# Patient Record
Sex: MALE | Race: WHITE
[De-identification: names, ages, dates, MRNs, and addresses within clinical notes are randomized per-mention and may not be internally consistent; named-entity substitution may affect disease eponyms.]

---

## 2018-03-14 ENCOUNTER — HOSPITAL ENCOUNTER (EMERGENCY)
Dept: HOSPITAL 47 - EC | Age: 28
Discharge: HOME | End: 2018-03-14
Payer: COMMERCIAL

## 2018-03-14 VITALS — RESPIRATION RATE: 18 BRPM | TEMPERATURE: 97.7 F

## 2018-03-14 VITALS — DIASTOLIC BLOOD PRESSURE: 60 MMHG | SYSTOLIC BLOOD PRESSURE: 125 MMHG | HEART RATE: 77 BPM

## 2018-03-14 DIAGNOSIS — N20.1: Primary | ICD-10-CM

## 2018-03-14 LAB
ALBUMIN SERPL-MCNC: 4.3 G/DL (ref 3.5–5)
ALP SERPL-CCNC: 112 U/L (ref 38–126)
ALT SERPL-CCNC: 41 U/L (ref 21–72)
AMYLASE SERPL-CCNC: 31 U/L (ref 30–110)
ANION GAP SERPL CALC-SCNC: 13 MMOL/L
AST SERPL-CCNC: 27 U/L (ref 17–59)
BASOPHILS # BLD AUTO: 0.1 K/UL (ref 0–0.2)
BASOPHILS NFR BLD AUTO: 0 %
BUN SERPL-SCNC: 14 MG/DL (ref 9–20)
CALCIUM SPEC-MCNC: 9.9 MG/DL (ref 8.4–10.2)
CHLORIDE SERPL-SCNC: 107 MMOL/L (ref 98–107)
CO2 SERPL-SCNC: 22 MMOL/L (ref 22–30)
EOSINOPHIL # BLD AUTO: 0.2 K/UL (ref 0–0.7)
EOSINOPHIL NFR BLD AUTO: 2 %
ERYTHROCYTE [DISTWIDTH] IN BLOOD BY AUTOMATED COUNT: 5.23 M/UL (ref 4.3–5.9)
ERYTHROCYTE [DISTWIDTH] IN BLOOD: 13.2 % (ref 11.5–15.5)
GLUCOSE SERPL-MCNC: 112 MG/DL (ref 74–99)
HCT VFR BLD AUTO: 43.1 % (ref 39–53)
HGB BLD-MCNC: 14.4 GM/DL (ref 13–17.5)
LIPASE SERPL-CCNC: 53 U/L (ref 23–300)
LYMPHOCYTES # SPEC AUTO: 1.7 K/UL (ref 1–4.8)
LYMPHOCYTES NFR SPEC AUTO: 16 %
MCH RBC QN AUTO: 27.5 PG (ref 25–35)
MCHC RBC AUTO-ENTMCNC: 33.4 G/DL (ref 31–37)
MCV RBC AUTO: 82.4 FL (ref 80–100)
MONOCYTES # BLD AUTO: 0.4 K/UL (ref 0–1)
MONOCYTES NFR BLD AUTO: 4 %
NEUTROPHILS # BLD AUTO: 8.3 K/UL (ref 1.3–7.7)
NEUTROPHILS NFR BLD AUTO: 76 %
PH UR: 5.5 [PH] (ref 5–8)
PLATELET # BLD AUTO: 295 K/UL (ref 150–450)
POTASSIUM SERPL-SCNC: 4.4 MMOL/L (ref 3.5–5.1)
PROT SERPL-MCNC: 7.2 G/DL (ref 6.3–8.2)
PROT UR QL: (no result)
RBC UR QL: 20 /HPF (ref 0–5)
SODIUM SERPL-SCNC: 142 MMOL/L (ref 137–145)
SP GR UR: 1.03 (ref 1–1.03)
UROBILINOGEN UR QL STRIP: <2 MG/DL (ref ?–2)
WBC # BLD AUTO: 10.9 K/UL (ref 3.8–10.6)
WBC #/AREA URNS HPF: 2 /HPF (ref 0–5)

## 2018-03-14 PROCEDURE — 36415 COLL VENOUS BLD VENIPUNCTURE: CPT

## 2018-03-14 PROCEDURE — 81001 URINALYSIS AUTO W/SCOPE: CPT

## 2018-03-14 PROCEDURE — 74176 CT ABD & PELVIS W/O CONTRAST: CPT

## 2018-03-14 PROCEDURE — 83690 ASSAY OF LIPASE: CPT

## 2018-03-14 PROCEDURE — 85025 COMPLETE CBC W/AUTO DIFF WBC: CPT

## 2018-03-14 PROCEDURE — 82150 ASSAY OF AMYLASE: CPT

## 2018-03-14 PROCEDURE — 74018 RADEX ABDOMEN 1 VIEW: CPT

## 2018-03-14 PROCEDURE — 99284 EMERGENCY DEPT VISIT MOD MDM: CPT

## 2018-03-14 PROCEDURE — 96374 THER/PROPH/DIAG INJ IV PUSH: CPT

## 2018-03-14 PROCEDURE — 96375 TX/PRO/DX INJ NEW DRUG ADDON: CPT

## 2018-03-14 PROCEDURE — 80053 COMPREHEN METABOLIC PANEL: CPT

## 2018-03-14 NOTE — ED
General Adult HPI





- General


Chief complaint: Abdominal Pain


Stated complaint: Right Side Pain


Time Seen by Provider: 03/14/18 13:00


Source: patient, RN notes reviewed


Mode of arrival: ambulatory


Limitations: no limitations





- History of Present Illness


Initial comments: 





This is a 27-year-old male who presents emergency Department complaining of 

right-sided abdominal pain into his back patient denies any pain with 

palpation.  Patient states this made him nauseated and has been vomiting.  

Patient states it started at 9:00 today very abruptly.  Patient denies any 

history of kidney stones.  Patient denies any hematuria dysuria or urinary 

frequency.  Patient denies any diarrhea.  Patient denies any recent fever 

chills.





- Related Data


 Home Medications











 Medication  Instructions  Recorded  Confirmed


 


Acetaminophen Tab [Tylenol Tab] 650 mg PO ONCE 03/14/18 03/14/18








 Previous Rx's











 Medication  Instructions  Recorded


 


Hydrocodone/Acetaminophen [Norco 1 each PO Q4HR PRN #20 tab 03/14/18





5-325]  


 


Ketorolac [Toradol] 10 mg PO Q6HR #15 tab 03/14/18


 


Tamsulosin [Flomax] 0.4 mg PO DAILY 7 Days  cap 03/14/18











 Allergies











Allergy/AdvReac Type Severity Reaction Status Date / Time


 


No Known Allergies Allergy   Verified 03/14/18 14:07














Review of Systems


ROS Statement: 


Those systems with pertinent positive or pertinent negative responses have been 

documented in the HPI.





ROS Other: All systems not noted in ROS Statement are negative.





Past Medical History


Past Medical History: No Reported History


History of Any Multi-Drug Resistant Organisms: None Reported


Past Surgical History: No Surgical Hx Reported


Past Psychological History: No Psychological Hx Reported


Smoking Status: Never smoker


Past Alcohol Use History: None Reported


Past Drug Use History: None Reported





General Exam





- General Exam Comments


Initial Comments: 





GENERAL:


Patient is well-developed and well-nourished.  Patient is nontoxic and well-

hydrated and is in mild distress.





ENT:


Neck is soft and supple.  No significant lymphadenopathy is noted.  Oropharynx 

is clear.  Moist mucous membranes.  Neck has full range of motion without 

eliciting any pain.  





EYES:


The sclera were anicteric and conjunctiva were pink and moist.  Extraocular 

movements were intact and pupils were equal round and reactive to light.  

Eyelids were unremarkable.





PULMONARY:


Unlabored respirations.  Good breath sounds bilaterally.  No audible rales 

rhonchi or wheezing was noted.





CARDIOVASCULAR:


There is a regular rate and rhythm without any murmurs gallops or rubs. 





ABDOMEN:


Soft and nontender with normal bowel sounds.  No palpable organomegaly was 

noted.  There is no palpable pulsatile mass.





SKIN:


Skin is clear with no lesions or rashes and otherwise unremarkable.





NEUROLOGIC:


Patient is alert and oriented x3.  Cranial nerves II through XII are grossly 

intact.  Motor and sensory are also intact.  Normal speech, volume and content.

  Symmetrical smile.  





MUSCULOSKELETAL:


Normal extremities with adequate strength and full range of motion.  





LYMPHATICS:


No significant lymphadenopathy is noted





PSYCHIATRIC:


Normal psychiatric evaluation.  Normal interpersonal interactions appears 

functionally intact in deals appropriately with others.  No signs of 

depression.  No signs of anxiety. 


Limitations: no limitations





Course


 Vital Signs











  03/14/18





  13:00


 


Temperature 97.7 F


 


Pulse Rate 96


 


Respiratory 18





Rate 


 


Blood Pressure 133/80


 


O2 Sat by Pulse 98





Oximetry 














Medical Decision Making





- Medical Decision Making





Toradol helped the patient's pain considerably but was still having some pains 

I gave the patient some morphine.





Computed tomography scan showed a 3 mm stone.





- Lab Data


Result diagrams: 


 03/14/18 13:47





 03/14/18 13:47


 Lab Results











  03/14/18 03/14/18 03/14/18 Range/Units





  13:47 13:47 13:47 


 


WBC   10.9 H   (3.8-10.6)  k/uL


 


RBC   5.23   (4.30-5.90)  m/uL


 


Hgb   14.4   (13.0-17.5)  gm/dL


 


Hct   43.1   (39.0-53.0)  %


 


MCV   82.4   (80.0-100.0)  fL


 


MCH   27.5   (25.0-35.0)  pg


 


MCHC   33.4   (31.0-37.0)  g/dL


 


RDW   13.2   (11.5-15.5)  %


 


Plt Count   295   (150-450)  k/uL


 


Neutrophils %   76   %


 


Lymphocytes %   16   %


 


Monocytes %   4   %


 


Eosinophils %   2   %


 


Basophils %   0   %


 


Neutrophils #   8.3 H   (1.3-7.7)  k/uL


 


Lymphocytes #   1.7   (1.0-4.8)  k/uL


 


Monocytes #   0.4   (0-1.0)  k/uL


 


Eosinophils #   0.2   (0-0.7)  k/uL


 


Basophils #   0.1   (0-0.2)  k/uL


 


Sodium  142    (137-145)  mmol/L


 


Potassium  4.4    (3.5-5.1)  mmol/L


 


Chloride  107    ()  mmol/L


 


Carbon Dioxide  22    (22-30)  mmol/L


 


Anion Gap  13    mmol/L


 


BUN  14    (9-20)  mg/dL


 


Creatinine  1.19    (0.66-1.25)  mg/dL


 


Est GFR (CKD-EPI)AfAm  >90    (>60 ml/min/1.73 sqM)  


 


Est GFR (CKD-EPI)NonAf  85    (>60 ml/min/1.73 sqM)  


 


Glucose  112 H    (74-99)  mg/dL


 


Calcium  9.9    (8.4-10.2)  mg/dL


 


Total Bilirubin  0.7    (0.2-1.3)  mg/dL


 


AST  27    (17-59)  U/L


 


ALT  41    (21-72)  U/L


 


Alkaline Phosphatase  112    ()  U/L


 


Total Protein  7.2    (6.3-8.2)  g/dL


 


Albumin  4.3    (3.5-5.0)  g/dL


 


Amylase  31    ()  U/L


 


Lipase  53    ()  U/L


 


Urine Color    Yellow  


 


Urine Appearance    Cloudy  (Clear)  


 


Urine pH    5.5  (5.0-8.0)  


 


Ur Specific Gravity    1.029  (1.001-1.035)  


 


Urine Protein    2+ H  (Negative)  


 


Urine Glucose (UA)    Negative  (Negative)  


 


Urine Ketones    Negative  (Negative)  


 


Urine Blood    Large H  (Negative)  


 


Urine Nitrite    Negative  (Negative)  


 


Urine Bilirubin    Negative  (Negative)  


 


Urine Urobilinogen    <2.0  (<2.0)  mg/dL


 


Ur Leukocyte Esterase    Negative  (Negative)  


 


Urine RBC    20 H  (0-5)  /hpf


 


Urine WBC    2  (0-5)  /hpf


 


Urine Bacteria    Rare H  (None)  /hpf


 


Urine Mucus    Moderate H  (None)  /hpf














Disposition


Clinical Impression: 


 Ureterolithiasis





Disposition: HOME SELF-CARE


Condition: Good


Instructions:  Ureteral Stones (ED)


Prescriptions: 


Hydrocodone/Acetaminophen [Norco 5-325] 1 each PO Q4HR PRN #20 tab


 PRN Reason: Pain


Ketorolac [Toradol] 10 mg PO Q6HR #15 tab


Tamsulosin [Flomax] 0.4 mg PO DAILY 7 Days  cap


Referrals: 


Sai Hearn MD [Primary Care Provider] - 1-2 days


Time of Disposition: 15:07

## 2018-03-14 NOTE — XR
EXAMINATION TYPE: XR KUB

 

DATE OF EXAM: 3/14/2018

 

COMPARISON: NONE

 

HISTORY: Abdominal pain

 

TECHNIQUE: One view abdominal series

 

FINDINGS:  

The osseous structures are intact.  The bowel gas pattern is nonspecific. Lung bases are clear. There
 is a curvature of the spine. Retained fecal debris along the right colon. There is a paucity of lizeth
l of bowel gas.

 

IMPRESSION:  

1.  Nonspecific abdomen. Paucity of bowel gas can be associated with enteritis or ileus.

## 2018-03-14 NOTE — CT
EXAMINATION TYPE: CT abdomen pelvis wo con

 

DATE OF EXAM: 3/14/2018

 

COMPARISON: NONE

 

INDICATION: Right flank pain

 

DLP: 1885 mGycm, Automated exposure control for dose reduction was used.

 

CONTRAST: mL of . 

                        Study performed without Oral Contrast

 

TECHNIQUE: Axial images were obtained from above the diaphragm to the pubic rami in the axial plane a
t 5 mm thick sections.  Reconstructed images are reviewed on the computer in the coronal plane.  

 

FINDINGS:

 

Limited CT sections are obtained the lung bases.  The lung bases are clear.

 

CT ABDOMEN:

 

Liver: Normal

 

Spleen: Normal

 

Pancreas: Normal

 

Adrenal glands: The adrenal glands are normal.

 

Gallbladder: Normal  

 

Kidneys: No masses are evident. Minimal right hydronephrosis may be present. Significant hydroureter 
is not evident. In the coronal plane however tracking into the distal right ureter above the ureterov
esical junction is a tiny calcification is could be a distal ureteral stone. No cysts are present.

 

Aorta: Vascular calcification is within the aorta. 

 

Inferior vena cava: Normal.

 

CT PELVIS: 

Loops of bowel within the abdomen and pelvis are normal.     There are loops of bowel which are incom
pletely distended or lack oral contrast limiting their evaluation.

 

Appendix: Normal as visualized.

 

Urinary bladder: Normal. 

 

Genitourinary structures: Prostate is normal.

 

Osseous structures: No suspicious lytic or sclerotic lesions. No suspicious osseous abnormality.

 

IMPRESSIONS:

1.  0.3 cm distal right ureteral stone with minimal hydronephrosis and hydroureter.

2. Normal appendix

## 2018-10-13 ENCOUNTER — HOSPITAL ENCOUNTER (EMERGENCY)
Dept: HOSPITAL 47 - EC | Age: 28
Discharge: HOME | End: 2018-10-13
Payer: COMMERCIAL

## 2018-10-13 VITALS — SYSTOLIC BLOOD PRESSURE: 111 MMHG | RESPIRATION RATE: 19 BRPM | HEART RATE: 74 BPM | DIASTOLIC BLOOD PRESSURE: 61 MMHG

## 2018-10-13 VITALS — TEMPERATURE: 98.1 F

## 2018-10-13 DIAGNOSIS — S81.812A: ICD-10-CM
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
## 2018-10-13 DIAGNOSIS — I83.892: Primary | ICD-10-CM

## 2018-10-13 PROCEDURE — 12001 RPR S/N/AX/GEN/TRNK 2.5CM/<: CPT

## 2018-10-13 PROCEDURE — 99283 EMERGENCY DEPT VISIT LOW MDM: CPT

## 2018-10-13 NOTE — ED
Wound/Laceration HPI





- General


Source: patient, EMS


Mode of arrival: EMS


Limitations: no limitations





<Kalpana German - Last Filed: 10/13/18 04:59>





<Promise Ramsay - Last Filed: 10/14/18 00:25>





- General


Chief Complaint: Wound/Laceration


Stated Complaint: leg bleeding


Time Seen by Provider: 10/13/18 03:39





- History of Present Illness


Initial Comments: 


28-year-old male patient presents to the emergency department today for 

evaluation of bleeding to the left lower leg.  Patient states that he has had a 

cluster of vessels which sounds like a possible varicose vein that developed on 

his left lateral calf couple of months ago.  Patient states tonight he got out 

of the shower and slipped hitting his leg on the side of a cupboard.  Patient 

states it hit that spotting cause it to stop bleeding.  Patient states the 

blood was squirting out and he is unable to get it to stop at home.  He denies 

taking any anticoagulant medications.  He denies any bleeding disorders or 

other medical conditions.  States he was able to catch himself during the fall 

and denies any other injuries.  He denies hitting his head or losing 

consciousness. Patient denies any headache, neck pain, back pain, chest pain, 

shortness of breath, dizziness, weakness, abdominal pain, nausea, vomiting, or 

difficulties with bowel movements or urination.


 (Kalpana German)





- Related Data


 Home Medications











 Medication  Instructions  Recorded  Confirmed


 


Acetaminophen Tab [Tylenol Tab] 650 mg PO ONCE 03/14/18 03/14/18








 Previous Rx's











 Medication  Instructions  Recorded


 


Hydrocodone/Acetaminophen [Norco 1 each PO Q4HR PRN #20 tab 03/14/18





5-325]  


 


Ketorolac [Toradol] 10 mg PO Q6HR #15 tab 03/14/18


 


Tamsulosin [Flomax] 0.4 mg PO DAILY 7 Days  cap 03/14/18











 Allergies











Allergy/AdvReac Type Severity Reaction Status Date / Time


 


No Known Allergies Allergy   Verified 03/14/18 14:07














Review of Systems


ROS Other: All systems not noted in ROS Statement are negative.





<Kalpana German - Last Filed: 10/13/18 04:59>


ROS Other: All systems not noted in ROS Statement are negative.





<Promise Ramsay - Last Filed: 10/14/18 00:25>


ROS Statement: 


Those systems with pertinent positive or pertinent negative responses have been 

documented in the HPI.








Past Medical History


Past Medical History: No Reported History


History of Any Multi-Drug Resistant Organisms: None Reported


Past Surgical History: No Surgical Hx Reported


Past Psychological History: No Psychological Hx Reported


Smoking Status: Never smoker


Past Alcohol Use History: None Reported


Past Drug Use History: None Reported





<Kalpana German - Last Filed: 10/13/18 04:59>





General Exam


Limitations: no limitations


General appearance: alert, in no apparent distress, other (This is a well-

developed, well-nourished adult male patient in no acute distress.  Vital signs 

upon presentation are temperature 98.1F, pulse 94, respirations 15, blood 

pressure 138/78, pulse ox 98% on room air.)


Head exam: Present: atraumatic, normocephalic, normal inspection


Eye exam: Present: normal appearance, PERRL, EOMI.  Absent: scleral icterus, 

conjunctival injection, periorbital swelling


Neck exam: Present: normal inspection, full ROM, other (Nontender, no step-off, 

no deformity to firm midline palpation of the posterior cervical spine.  Full 

range of motion without pain or limitation.).  Absent: tenderness, meningismus, 

lymphadenopathy


Respiratory exam: Present: normal lung sounds bilaterally.  Absent: respiratory 

distress, wheezes, rales, rhonchi, stridor


Cardiovascular Exam: Present: regular rate, normal rhythm, normal heart sounds.

  Absent: systolic murmur, diastolic murmur, rubs, gallop, clicks


Extremities exam: Present: full ROM, normal capillary refill, other (There is 

superficial bleeding vessel to the left lateral calf, blood is squirting, 

nonpulsatile.  Skin below the lesion is pink, warm, and dry.  Cap refills less 

than 3 seconds.  Pedal pulses 2+ and equal bilaterally.).  Absent: normal 

inspection, tenderness, pedal edema, joint swelling, calf tenderness


Back exam: Present: normal inspection, other (Nontender, no step-off, no 

deformity to firm midline palpation of the thoracic and lumbar vertebrae. Full 

range of motion without pain or limitation.).  Absent: vertebral tenderness


Neurological exam: Present: alert, oriented X3, CN II-XII intact


Psychiatric exam: Present: normal affect, normal mood


Skin exam: Present: warm, dry, intact, normal color.  Absent: rash





<Kalpana German - Last Filed: 10/13/18 04:59>





 Vital Signs











  10/13/18 10/13/18





  03:39 05:10


 


Temperature 98.1 F 


 


Pulse Rate 94 74


 


Respiratory 15 19





Rate  


 


Blood Pressure 138/78 111/61


 


O2 Sat by Pulse 98 100





Oximetry  














Procedures





- Laceration


  ** Laceration #1


Consent Obtained: verbal consent


Time Out Performed: Yes


Indication: other (Bleeding varicose vein)


Site: lower extremity (Left lateral calf)


Anesthetic Used: lidocaine 1%, with epi


Anesthesia Technique: local infiltration


Amount (mls): 5


Type of Sutures: nylon


Size of Sutures: 4-0


Number of Sutures: 2


Technique: simple, interrupted


Patient Tolerated Procedure: well, no complications





<Kalpana German - Last Filed: 10/13/18 04:59>





<Promise Ramsay - Last Filed: 10/14/18 00:25>





- Laceration


  ** Laceration #1


Additional Comments: 


Size 1 mm


 (Kalpana German)





Medical Decision Making





<Kalpana German - Last Filed: 10/13/18 04:59>





<Promise Ramsay - Last Filed: 10/14/18 00:25>





- Medical Decision Making


28 year-old male patient presented to the emergency department today for 

evaluation of bleeding from his leg. Patient had puncture type lesion to the 

varicose vein on the left lateral calf, blood spurting.  Infiltrated the wound 

with 1% lidocaine with epinephrine, bleeding did slow but did not stop.  Did 

insert 2 sutures, no evidence of further bleeding.  Patient tolerated procedure 

well.  Vital signs stable.  Patient instructed to follow-up with primary care 

physician, he does not have one, did refer him to one.  He is instructed to 

return in 7 days to have sutures removed.  Educated regarding wound care and 

signs or symptoms of infection.  Return parameters discussed in detail.  He 

verbalizes understanding and agrees with this plan.


 (Kalpana German)


I personally saw and examined the patient.  I reviewed and agree with the mid-

level provider findings including all diagnostic interpretations and treatment 

plans as written unless otherwise stated.  I was present for key portions of 

any procedures performed.


 (Promise Ramsay)





Disposition


Is patient prescribed a controlled substance at d/c from ED?: No


Time of Disposition: 04:58





<Kalpana German M - Last Filed: 10/13/18 04:59>





<Promise Ramsay P - Last Filed: 10/14/18 00:25>


Clinical Impression: 


 Bleeding from varicose veins of left lower extremity





Disposition: HOME SELF-CARE


Condition: Good


Instructions:  Care For Your Stitches (ED), Acute Wound Care (ED)


Additional Instructions: 


Keep wound clean and dry.  Return in 7 days for removal of stitches.  Monitor 

for signs or symptoms of infection including but not limited to redness, 

swelling, drainage of pus, fever, or chills per Follow-up with a primary care 

physician for recheck in 1-2 days.  Return here immediately for any new, 

worsening, or concerning symptoms.


Referrals: 


Wayne Romeo DO [STAFF PHYSICIAN] - 1-2 days

## 2018-11-09 ENCOUNTER — HOSPITAL ENCOUNTER (EMERGENCY)
Dept: HOSPITAL 47 - EC | Age: 28
Discharge: HOME | End: 2018-11-09
Payer: COMMERCIAL

## 2018-11-09 VITALS — HEART RATE: 109 BPM | RESPIRATION RATE: 16 BRPM | SYSTOLIC BLOOD PRESSURE: 140 MMHG | DIASTOLIC BLOOD PRESSURE: 91 MMHG

## 2018-11-09 VITALS — TEMPERATURE: 98.1 F

## 2018-11-09 DIAGNOSIS — I83.892: Primary | ICD-10-CM

## 2018-11-09 DIAGNOSIS — R58: ICD-10-CM

## 2018-11-09 DIAGNOSIS — Z79.899: ICD-10-CM

## 2018-11-09 PROCEDURE — 99283 EMERGENCY DEPT VISIT LOW MDM: CPT

## 2018-11-09 NOTE — ED
Wound/Laceration HPI





- General


Chief Complaint: Wound/Laceration


Stated Complaint: leg pain


Time Seen by Provider: 11/09/18 20:00


Source: patient, RN notes reviewed, old records reviewed


Mode of arrival: ambulatory


Limitations: no limitations





- History of Present Illness


Initial Comments: 





Patient is a 28 year old male with CC of bleeding from varicose vein from left 

shin. Patient reports that one month ago he hit his leg, which caused severe 

bleeding. Patient at that time required stitchest toligate the bleeding vessel. 

He reports that today, he hit the same area an accident and is concerned that 

he will need hte same. He reported significant bleeding over his clothes and 

bathroom. Denies anticoagulation. He has multiple varicose veins due to weight 

and standing all the time due to his job. 





- Related Data


 Home Medications











 Medication  Instructions  Recorded  Confirmed


 


Acetaminophen Tab [Tylenol Tab] 650 mg PO ONCE 03/14/18 03/14/18








 Previous Rx's











 Medication  Instructions  Recorded


 


Hydrocodone/Acetaminophen [Norco 1 each PO Q4HR PRN #20 tab 03/14/18





5-325]  


 


Ketorolac [Toradol] 10 mg PO Q6HR #15 tab 03/14/18


 


Tamsulosin [Flomax] 0.4 mg PO DAILY 7 Days  cap 03/14/18











 Allergies











Allergy/AdvReac Type Severity Reaction Status Date / Time


 


No Known Allergies Allergy   Verified 11/09/18 19:50














Review of Systems


ROS Statement: 


Those systems with pertinent positive or pertinent negative responses have been 

documented in the HPI.





ROS Other: All systems not noted in ROS Statement are negative.





Past Medical History


Past Medical History: No Reported History


History of Any Multi-Drug Resistant Organisms: None Reported


Past Surgical History: No Surgical Hx Reported


Past Psychological History: No Psychological Hx Reported


Smoking Status: Never smoker


Past Alcohol Use History: None Reported


Past Drug Use History: None Reported





General Exam





- General Exam Comments


Initial Comments: 





Well appearing pleasant 28 year old male, no distress. 


Limitations: no limitations


General appearance: alert, in no apparent distress


Head exam: Present: atraumatic, normocephalic, normal inspection


Eye exam: Present: normal appearance, PERRL, EOMI.  Absent: scleral icterus, 

conjunctival injection, periorbital swelling


ENT exam: Present: normal exam, mucous membranes moist


Neck exam: Present: normal inspection.  Absent: tenderness, meningismus, 

lymphadenopathy


Respiratory exam: Present: normal lung sounds bilaterally.  Absent: respiratory 

distress, wheezes, rales, rhonchi, stridor


Cardiovascular Exam: Present: regular rate, normal rhythm, normal heart sounds.

  Absent: systolic murmur, diastolic murmur, rubs, gallop, clicks


Extremities exam: Present: normal inspection, full ROM, normal capillary refill

, other (varicosity to left shin, when dressing is removed scab has formed and 

no further bleeding noted. ).  Absent: tenderness, pedal edema, joint swelling, 

calf tenderness


Back exam: Present: normal inspection


Neurological exam: Present: alert, oriented X3, CN II-XII intact


Psychiatric exam: Present: normal affect, normal mood





Course


 Vital Signs











  11/09/18 11/09/18





  19:48 21:25


 


Temperature 98.1 F 


 


Pulse Rate 102 H 109 H


 


Respiratory 20 16





Rate  


 


Blood Pressure 188/104 140/91


 


O2 Sat by Pulse 99 96





Oximetry  














Medical Decision Making





- Medical Decision Making





Patient is a 28 year old male with CC of bleeding varicose vein on left leg, 

after he hit the area on accident. Patient at this time had dressing removed 

and found that the bleeding has subsided. Patient was initially concerned that 

it will Rebleed. Discussed if I place sutures in the area and retraumatize this 

it could rebleed and prolong the issue. Patient then agrees to using gelfoam 

and gauze wrap over the area. Discussed that patient need to use compression 

stockings and to follow up with vascular specialty. Return parameters 

discussed. 





Disposition


Clinical Impression: 


 Bleeding from varicose veins of left lower extremity





Disposition: HOME SELF-CARE


Condition: Good


Instructions:  Vein Stripping (DC)


Additional Instructions: 


Patient advised to wear compression stockings while at work.  Patient should 

keep the wound covered with dressing to ensure that no further skin abrasions 

get occur.  Patient should follow-up with vascular surgery.  Keep the legs up 

and elevated.  If the wound does continue to rebleed apply firm pressure for 20 

minutes keep the leg elevated.


Is patient prescribed a controlled substance at d/c from ED?: No


Referrals: 


Sai Hearn MD [Primary Care Provider] - 1-2 days


Jordy Carver MD [STAFF PHYSICIAN] - 1-2 days


Time of Disposition: 20:24

## 2018-11-10 NOTE — CDI
Documentation Clarification OP





Dear HERNÁN Whitley:



Please do addendum to ED report for HPI , Physical exam and MDM.



Thank you,   

Madelyn Rogers





If you have any question, Please contact  at [phone]



[facility]

## 2019-01-05 ENCOUNTER — HOSPITAL ENCOUNTER (EMERGENCY)
Dept: HOSPITAL 47 - EC | Age: 29
Discharge: HOME | End: 2019-01-05
Payer: MEDICAID

## 2019-01-05 VITALS
RESPIRATION RATE: 24 BRPM | TEMPERATURE: 98.2 F | SYSTOLIC BLOOD PRESSURE: 120 MMHG | DIASTOLIC BLOOD PRESSURE: 84 MMHG | HEART RATE: 103 BPM

## 2019-01-05 DIAGNOSIS — J40: Primary | ICD-10-CM

## 2019-01-05 DIAGNOSIS — R00.0: ICD-10-CM

## 2019-01-05 DIAGNOSIS — D72.829: ICD-10-CM

## 2019-01-05 LAB
ALBUMIN SERPL-MCNC: 4.7 G/DL (ref 3.5–5)
ALP SERPL-CCNC: 94 U/L (ref 38–126)
ALT SERPL-CCNC: 29 U/L (ref 21–72)
ANION GAP SERPL CALC-SCNC: 11 MMOL/L
AST SERPL-CCNC: 42 U/L (ref 17–59)
BASOPHILS # BLD AUTO: 0 K/UL (ref 0–0.2)
BASOPHILS NFR BLD AUTO: 0 %
BUN SERPL-SCNC: 13 MG/DL (ref 9–20)
CALCIUM SPEC-MCNC: 9.6 MG/DL (ref 8.4–10.2)
CHLORIDE SERPL-SCNC: 107 MMOL/L (ref 98–107)
CO2 SERPL-SCNC: 21 MMOL/L (ref 22–30)
EOSINOPHIL # BLD AUTO: 0.3 K/UL (ref 0–0.7)
EOSINOPHIL NFR BLD AUTO: 2 %
ERYTHROCYTE [DISTWIDTH] IN BLOOD BY AUTOMATED COUNT: 5.49 M/UL (ref 4.3–5.9)
ERYTHROCYTE [DISTWIDTH] IN BLOOD: 13.5 % (ref 11.5–15.5)
GLUCOSE SERPL-MCNC: 187 MG/DL (ref 74–99)
HCT VFR BLD AUTO: 46 % (ref 39–53)
HGB BLD-MCNC: 15 GM/DL (ref 13–17.5)
LYMPHOCYTES # SPEC AUTO: 1 K/UL (ref 1–4.8)
LYMPHOCYTES NFR SPEC AUTO: 7 %
MAGNESIUM SPEC-SCNC: 2.1 MG/DL (ref 1.6–2.3)
MCH RBC QN AUTO: 27.4 PG (ref 25–35)
MCHC RBC AUTO-ENTMCNC: 32.7 G/DL (ref 31–37)
MCV RBC AUTO: 83.8 FL (ref 80–100)
MONOCYTES # BLD AUTO: 0.2 K/UL (ref 0–1)
MONOCYTES NFR BLD AUTO: 2 %
NEUTROPHILS # BLD AUTO: 12.7 K/UL (ref 1.3–7.7)
NEUTROPHILS NFR BLD AUTO: 90 %
PLATELET # BLD AUTO: 280 K/UL (ref 150–450)
POTASSIUM SERPL-SCNC: 5.4 MMOL/L (ref 3.5–5.1)
PROT SERPL-MCNC: 8.7 G/DL (ref 6.3–8.2)
SODIUM SERPL-SCNC: 139 MMOL/L (ref 137–145)
WBC # BLD AUTO: 14.2 K/UL (ref 3.8–10.6)

## 2019-01-05 PROCEDURE — 93005 ELECTROCARDIOGRAM TRACING: CPT

## 2019-01-05 PROCEDURE — 85025 COMPLETE CBC W/AUTO DIFF WBC: CPT

## 2019-01-05 PROCEDURE — 87502 INFLUENZA DNA AMP PROBE: CPT

## 2019-01-05 PROCEDURE — 94640 AIRWAY INHALATION TREATMENT: CPT

## 2019-01-05 PROCEDURE — 71046 X-RAY EXAM CHEST 2 VIEWS: CPT

## 2019-01-05 PROCEDURE — 83735 ASSAY OF MAGNESIUM: CPT

## 2019-01-05 PROCEDURE — 80053 COMPREHEN METABOLIC PANEL: CPT

## 2019-01-05 PROCEDURE — 99285 EMERGENCY DEPT VISIT HI MDM: CPT

## 2019-01-05 PROCEDURE — 96360 HYDRATION IV INFUSION INIT: CPT

## 2019-01-05 PROCEDURE — 96361 HYDRATE IV INFUSION ADD-ON: CPT

## 2019-01-05 PROCEDURE — 36415 COLL VENOUS BLD VENIPUNCTURE: CPT

## 2019-01-05 NOTE — XR
EXAMINATION TYPE: XR chest 2V

 

DATE OF EXAM: 1/5/2019

 

COMPARISON: 1/3/2018

 

HISTORY: Short of breath

 

TECHNIQUE:  Frontal and lateral views of the chest are obtained.

 

FINDINGS:  Heart and mediastinum are normal. Lungs are clear of consolidation. There is no pleural ef
fusion. There are chest leads. Costophrenic angles are clear. There is slight coarsening of the lung 
markings.

 

IMPRESSION:  Very slight coarsening of the lower lobe lung markings similar to old exam. No adverse c
hange compared to old exam.

## 2019-01-05 NOTE — ED
SOB HPI





- General


Chief Complaint: Shortness of Breath


Stated Complaint: TOD


Time Seen by Provider: 01/05/19 20:38


Source: patient, family


Mode of arrival: ambulatory


Limitations: no limitations





- History of Present Illness


Initial Comments: 





Patient is a 28-year-old male presenting for shortness of breath and cough.  

The patient states that it started yesterday.  He states that he has no history 

of asthma currently but did have some as a child.  He does not smoke and admits 

to subjective fevers or chills.  He admits to shortness of breath and no chest 

pain.  He has had some nausea but no vomiting or diarrhea.





- Related Data


 Previous Rx's











 Medication  Instructions  Recorded


 


Albuterol Inhaler [Ventolin Hfa 1 - 2 puff INHALATION Q6HR PRN #1 01/05/19





Inhaler] inhaler 


 


Azithromycin [Zithromax] 0 mg PO AS DIRECTED #6 tab 01/05/19


 


predniSONE 50 mg PO DAILY #4 tablet 01/05/19











 Allergies











Allergy/AdvReac Type Severity Reaction Status Date / Time


 


No Known Allergies Allergy   Verified 01/05/19 20:41














Review of Systems


ROS Statement: 


Those systems with pertinent positive or pertinent negative responses have been 

documented in the HPI.


Constitutional: Positive for chills, fatigue and fever.





HENT: Positive for congestion. 





Respiratory: Negative for chest tightness, positive for shortness of breath and 

wheezing.  Positive for cough





Cardiovascular: Negative for chest pain and palpitations.





Gastrointestinal: Negative for abdominal pain. Negative for abdominal distention

, diarrhea, nausea and vomiting.





Genitourinary: Negative for dysuria.





Musculoskeletal: Negative for back pain, neck pain and neck stiffness.





Skin: Negative for color change.





Neurological: Negative for dizziness, speech difficulty, weakness and light-

headedness.





Psychiatric/Behavioral: Negative for agitation and confusion. Negative for 

anxiety


ROS Other: All systems not noted in ROS Statement are negative.





Past Medical History


Past Medical History: No Reported History


History of Any Multi-Drug Resistant Organisms: None Reported


Past Surgical History: No Surgical Hx Reported


Past Psychological History: Anxiety, Depression


Smoking Status: Never smoker


Past Alcohol Use History: Occasional


Past Drug Use History: None Reported





General Exam





- General Exam Comments


Initial Comments: 





Constitutional: Pt is oriented to person, place, and time. Pt appears well-

developed and well-nourished. No distress.





HENT:





Head: Normocephalic and atraumatic.





Eyes: EOM are normal.





Neck: Normal range of motion. Neck supple.





Cardiovascular: Tachycardic, regular rhythm, S1 normal, S2 normal and normal 

heart sounds.  Exam reveals no gallop and no friction rub. No murmur heard.





Pulmonary/Chest: Effort normal and breath sounds normal. No tachypnea and no 

bradypnea. No respiratory distress.  No rales noted.  Diffuse wheezes noted in 

all lung fields





Abdominal: Soft. Bowel sounds are normal. Pt exhibits no shifting dullness, no 

distension, no pulsatile liver, no fluid wave, no abdominal bruit and no 

ascites. There is no tenderness. There is no rigidity, no rebound, no guarding, 

no tenderness at McBurney's point and negative Estrada's sign.





Musculoskeletal: Normal range of motion.





Neurological: Pt is alert and oriented to person, place, and time. No cranial 

nerve deficit.





Skin: Skin is warm and dry. No rash noted. Pt is not diaphoretic. No erythema. 

No pallor.





Psychiatric: Pt has a normal mood and affect. Pt behavior is normal. Thought 

content normal.


Limitations: no limitations





Course


 Vital Signs











  01/05/19 01/05/19 01/05/19





  20:25 21:00 21:09


 


Temperature 98.8 F  


 


Pulse Rate 110 H 102 H 111 H


 


Respiratory 20 20 18





Rate   


 


Blood Pressure 155/79 148/86 


 


O2 Sat by Pulse 94 L 97 





Oximetry   














  01/05/19 01/05/19 01/05/19





  21:30 22:00 22:30


 


Temperature   


 


Pulse Rate 105 H 106 H 101 H


 


Respiratory 22 22 18





Rate   


 


Blood Pressure 147/88 139/82 139/71


 


O2 Sat by Pulse 96 97 97





Oximetry   














  01/05/19





  23:00


 


Temperature 98.2 F


 


Pulse Rate 103 H


 


Respiratory 24





Rate 


 


Blood Pressure 120/84


 


O2 Sat by Pulse 96





Oximetry 














Medical Decision Making





- Medical Decision Making





Laboratory studies showed that there was mild leukocytosis of 14.2 and slight 

electrolyte derangements with a potassium 5.4.  However, there were no 

significant EKG changes and from a respiratory standpoint, influenza was also 

noted to be negative and chest x-ray was negative for infiltrate.  The patient 

was given breathing treatments and stated that his symptoms significantly 

improved.  Although he remained slightly tachycardic, it is soft at this is 

secondary to the albuterol and he was able to ambulate in the emergency 

department without any distress or hypoxia.  Because of this, the patient was 

thought to be safe for discharge.  He was given a prescription for prednisone, 

azithromycin as well as albuterol and advised follow-up with PCP in next 1-2 

days.  Patient was agreeable to plan.





- Lab Data


Result diagrams: 


 01/05/19 21:22





 01/05/19 21:22


 Lab Results











  01/05/19 01/05/19 01/05/19 Range/Units





  21:22 21:22 21:22 


 


WBC  14.2 H    (3.8-10.6)  k/uL


 


RBC  5.49    (4.30-5.90)  m/uL


 


Hgb  15.0    (13.0-17.5)  gm/dL


 


Hct  46.0    (39.0-53.0)  %


 


MCV  83.8    (80.0-100.0)  fL


 


MCH  27.4    (25.0-35.0)  pg


 


MCHC  32.7    (31.0-37.0)  g/dL


 


RDW  13.5    (11.5-15.5)  %


 


Plt Count  280    (150-450)  k/uL


 


Neutrophils %  90    %


 


Lymphocytes %  7    %


 


Monocytes %  2    %


 


Eosinophils %  2    %


 


Basophils %  0    %


 


Neutrophils #  12.7 H    (1.3-7.7)  k/uL


 


Lymphocytes #  1.0    (1.0-4.8)  k/uL


 


Monocytes #  0.2    (0-1.0)  k/uL


 


Eosinophils #  0.3    (0-0.7)  k/uL


 


Basophils #  0.0    (0-0.2)  k/uL


 


Sodium   139   (137-145)  mmol/L


 


Potassium   5.4 H   (3.5-5.1)  mmol/L


 


Chloride   107   ()  mmol/L


 


Carbon Dioxide   21 L   (22-30)  mmol/L


 


Anion Gap   11   mmol/L


 


BUN   13   (9-20)  mg/dL


 


Creatinine   0.92   (0.66-1.25)  mg/dL


 


Est GFR (CKD-EPI)AfAm   >90   (>60 ml/min/1.73 sqM)  


 


Est GFR (CKD-EPI)NonAf   >90   (>60 ml/min/1.73 sqM)  


 


Glucose   187 H   (74-99)  mg/dL


 


Calcium   9.6   (8.4-10.2)  mg/dL


 


Magnesium   2.1   (1.6-2.3)  mg/dL


 


Total Bilirubin   1.2   (0.2-1.3)  mg/dL


 


AST   42   (17-59)  U/L


 


ALT   29   (21-72)  U/L


 


Alkaline Phosphatase   94   ()  U/L


 


Total Protein   8.7 H   (6.3-8.2)  g/dL


 


Albumin   4.7   (3.5-5.0)  g/dL


 


Influenza Type A RNA    Not Detected  (Not Detectd)  


 


Influenza Type B (PCR)    Not Detected  (Not Detectd)  














- EKG Data


EKG Comments: 





EKG shows sinus tachycardia with a rate of 105, OH interval 154, QRS 88, QTC 

465.  There is no significant ST depressions or elevations.





Disposition


Clinical Impression: 


 Bronchitis





Disposition: HOME SELF-CARE


Condition: Good


Instructions:  Acute Bronchitis (ED)


Prescriptions: 


Albuterol Inhaler [Ventolin Hfa Inhaler] 1 - 2 puff INHALATION Q6HR PRN #1 

inhaler


 PRN Reason: Shortness Of Breath Or Wheezing


Azithromycin [Zithromax] 0 mg PO AS DIRECTED #6 tab


predniSONE 50 mg PO DAILY #4 tablet


Is patient prescribed a controlled substance at d/c from ED?: No


Referrals: 


Sai Hearn MD [Primary Care Provider] - 1-2 days


Time of Disposition: 23:00

## 2020-01-31 ENCOUNTER — HOSPITAL ENCOUNTER (OUTPATIENT)
Dept: HOSPITAL 47 - DBWHC3 | Age: 30
Discharge: HOME | End: 2020-01-31
Attending: FAMILY MEDICINE
Payer: MEDICAID

## 2020-01-31 VITALS — BODY MASS INDEX: 54.1 KG/M2

## 2020-01-31 DIAGNOSIS — E66.9: Primary | ICD-10-CM

## 2020-01-31 PROCEDURE — 97802 MEDICAL NUTRITION INDIV IN: CPT
